# Patient Record
Sex: MALE | Race: WHITE | Employment: STUDENT | URBAN - METROPOLITAN AREA
[De-identification: names, ages, dates, MRNs, and addresses within clinical notes are randomized per-mention and may not be internally consistent; named-entity substitution may affect disease eponyms.]

---
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
## 2024-05-30 ENCOUNTER — HOSPITAL ENCOUNTER (OUTPATIENT)
Facility: HOSPITAL | Age: 19
Setting detail: RECURRING SERIES
End: 2024-05-30
Payer: COMMERCIAL

## 2024-05-30 PROCEDURE — 97162 PT EVAL MOD COMPLEX 30 MIN: CPT | Performed by: GENERAL ACUTE CARE HOSPITAL

## 2024-05-30 NOTE — PROGRESS NOTES
PHYSICAL / OCCUPATIONAL THERAPY - DAILY TREATMENT NOTE (updated )  For Eval visit    Patient Name: Bhargav Knight    Date: 2024    : 2005  Insurance: Payor: DUNG / Plan: [facility] / Product Type: *No Product type* /      Patient  verified yes     Visit #   Current / Total 1 10   Time   In / Out 1152 1219   Total Treatment  Time  27   Pain   In / Out 5 5   Subjective Functional Status/Changes: See below     NEXT PROGRESS NOTE DUE: 2024    TREATMENT AREA =  Other low back pain [M54.59]    SUBJECTIVE:  Patient presents with c/o low back pain, patient states pain started about last  and has progressed. Patient states he has occasional numbness to BLE, especially when sitting. 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 He returns to college for wrestling camp end of .     Functional Limitations:  Radicular pain when sitting, increased back pain after practices, pain with picking up weight in front of him, pain with dead lifting     Max pain: 7/10  Avg pain: 5/10  Min pain: 0/10    Objective Measure: revised DIRK    38%    Past Medical Hx:  Acute onset of low back pain 2023, left LCL injury      Co-morbidities: none reported        OBJECTIVE    Modalities Rationale:     decrease inflammation and decrease pain to improve patient's ability to progress to PLOF and address remaining functional goals.     min [] Estim Unattended, type/location:                                      []  w/ice    []  w/heat    min [] Estim Attended, type/location:                                     []  w/US     []  w/ice    []  w/heat    []  TENS insruct      min []  Mechanical Traction: type/lbs                   []  pro   []  sup   []  int   []  cont    []  before manual    []  after manual    min []  Ultrasound, settings/location:      min []  Iontophoresis w/ dexamethasone, location:

## 2024-05-30 NOTE — PROGRESS NOTES
[facility]  [address], [facility], [address] Phone: [phone] Fax [phone]  Plan of Care / Statement of Necessity for Physical Therapy Services     Patient Name: Bhargav Knight : 2005   Medical   Diagnosis: *Other low back pain [M54.59] Treatment Diagnosis: M54.59  OTHER LOWER BACK PAIN     Onset Date: 2023 Payor: Payor: DUNG / Plan: [facility] / Product Type: *No Product type* /    Referral Source: Wilfrido Cheek MD Start of Care (SOC): 2024   Prior Hospitalization: See medical history Provider #: [id number]   Prior Level of Function: IND, collegiate wrestler    Comorbidities: None reported       Assessment / key information:    SUBJECTIVE:  Patient presents with c/o low back pain, patient states pain started about last  and has progressed. Patient states he has occasional numbness to BLE, especially when sitting. 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 He returns to college for wrestling camp end of .      Functional Limitations:  Radicular pain when sitting, increased back pain after practices, pain with picking up weight in front of him, pain with dead lifting      Max pain: 7/10  Avg pain: 5/10  Min pain: 0/10     Objective Measure: revised DIRK    38%     Past Medical Hx:  Acute onset of low back pain 2023, left LCL injury       Co-morbidities: none reported     Objective Information/Functional Measures/Assessment:  Fall Risk Assessment: Does the patient have a fear of falling? no If yes, what fall risk assessment was performed?      (+) response to gin extension   Right hip extension: 4/5, left hip extension 4-/5   Right glute max 4+/5, left hip extension 4-/5  Left hip ER/IR 4-/5 p! At left knee from previous LCL injury   Dynamic valgus with single leg squat taps on the left      Patient presents for evaluation to

## 2024-06-03 ENCOUNTER — APPOINTMENT (OUTPATIENT)
Facility: HOSPITAL | Age: 19
End: 2024-06-03
Payer: COMMERCIAL

## 2024-06-06 ENCOUNTER — HOSPITAL ENCOUNTER (OUTPATIENT)
Facility: HOSPITAL | Age: 19
Setting detail: RECURRING SERIES
Discharge: HOME | End: 2024-06-09
Payer: COMMERCIAL

## 2024-06-06 PROCEDURE — 97112 NEUROMUSCULAR REEDUCATION: CPT | Performed by: GENERAL ACUTE CARE HOSPITAL

## 2024-06-06 PROCEDURE — 97110 THERAPEUTIC EXERCISES: CPT | Performed by: GENERAL ACUTE CARE HOSPITAL

## 2024-06-06 NOTE — PROGRESS NOTES
PHYSICAL / OCCUPATIONAL THERAPY - DAILY TREATMENT NOTE (updated )    Patient Name: Bhargav Knight    Date: 2024    : 2005  Insurance: Payor: DUNG / Plan: [facility] / Product Type: *No Product type* /      Patient  verified yes     Visit #   Current / Total 2 10   Time   In / Out 1221 106   Total Treatment Time 45   Pain   In / Out 2-3 2-3   Subjective Functional Status/Changes: Patient states she had injection on Monday to L/S, feeling improved pain levels and less pain down his legs.      NEXT PROGRESS NOTE DUE: 2024    TREATMENT AREA =  Other low back pain [M54.59]    OBJECTIVE    Modalities Rationale:     decrease inflammation and decrease pain to improve patient's ability to progress to PLOF and address remaining functional goals.     min [] Estim Unattended, type/location:                                      []  w/ice    []  w/heat    min [] Estim Attended, type/location:                                     []  w/US     []  w/ice    []  w/heat    []  TENS insruct      min []  Mechanical Traction: type/lbs                   []  pro   []  sup   []  int   []  cont    []  before manual    []  after manual    min []  Ultrasound, settings/location:      min []  Iontophoresis w/ dexamethasone, location:                                               []  take home patch       []  in clinic    min  unbilled []  Ice     []  Heat    location/position:     min []  Paraffin,  details:     min []  Vasopneumatic Device, press/temp:     min []  Whirlpool / Fluido:    If using vaso (only need to measure limb vaso being performed on)      pre-treatment girth :       post-treatment girth :       measured at (landmark location) :      min []  Other:    Skin assessment post-treatment (if applicable):    []  intact    []  redness- no adverse reaction                 []redness - adverse reaction:      Vasopnuematic compression justification:  Per bilateral girth measures taken and listed above the edema

## 2024-06-11 ENCOUNTER — HOSPITAL ENCOUNTER (OUTPATIENT)
Facility: HOSPITAL | Age: 19
Setting detail: RECURRING SERIES
Discharge: HOME | End: 2024-06-14
Payer: COMMERCIAL

## 2024-06-11 PROCEDURE — 97112 NEUROMUSCULAR REEDUCATION: CPT

## 2024-06-11 PROCEDURE — 97110 THERAPEUTIC EXERCISES: CPT

## 2024-06-11 NOTE — PROGRESS NOTES
PHYSICAL / OCCUPATIONAL THERAPY - DAILY TREATMENT NOTE (updated )    Patient Name: Bhargav Knight    Date: 2024    : 2005  Insurance: Payor: DUNG / Plan: [facility] / Product Type: *No Product type* /      Patient  verified yes     Visit #   Current / Total 3 10   Time   In / Out 523 604   Total Treatment Time 41   Pain   In / Out 0 0   Subjective Functional Status/Changes: Patient denies pain upon arrival, reporting that he continues to notice benefits from L/S injection received last week.      NEXT PROGRESS NOTE DUE: 2024    TREATMENT AREA =  Other low back pain [M54.59]    OBJECTIVE      Therapeutic Procedures:  Tx Min Billable or 1:1 Min (if diff from Tx Min) Procedure, Rationale, Specifics   15 15 [id number] Therapeutic Exercise (timed):  increase ROM, strength, coordination, balance, and proprioception to improve patient's ability to progress to PLOF and address remaining functional goals. (see flow sheet as applicable)     Details if applicable:    TM  Prone Swimmers   Sliders (retro/abd)     x x [id number] Therapeutic Activity (timed):  use of dynamic activities replicating functional movements to increase ROM, strength, coordination, balance, and proprioception in order to improve patient's ability to progress to PLOF and address remaining functional goals.  (see flow sheet as applicable)     Details if applicable:       26  34892 Neuromuscular Re-Education (timed):  improve balance, coordination, kinesthetic sense, posture, core stability and proprioception to improve patient's ability to develop conscious control of individual muscles and awareness of position of extremities in order to progress to PLOF and address remaining functional goals. (see flow sheet as applicable)     Details if applicable:    BOSU squats  3 way hip on foam with emphasis on maintaining neutral pelvic alignment throughout/hip abductor/QL engagement   Bear planks  Black TB ext with march   SA flexion with TB

## 2024-06-13 ENCOUNTER — HOSPITAL ENCOUNTER (OUTPATIENT)
Facility: HOSPITAL | Age: 19
Setting detail: RECURRING SERIES
Discharge: HOME | End: 2024-06-16
Payer: COMMERCIAL

## 2024-06-13 PROCEDURE — 97112 NEUROMUSCULAR REEDUCATION: CPT | Performed by: GENERAL ACUTE CARE HOSPITAL

## 2024-06-13 PROCEDURE — 97530 THERAPEUTIC ACTIVITIES: CPT | Performed by: GENERAL ACUTE CARE HOSPITAL

## 2024-06-13 PROCEDURE — 97110 THERAPEUTIC EXERCISES: CPT | Performed by: GENERAL ACUTE CARE HOSPITAL

## 2024-06-13 NOTE — PROGRESS NOTES
PHYSICAL / OCCUPATIONAL THERAPY - DAILY TREATMENT NOTE (updated )    Patient Name: Bhargav Knight    Date: 2024    : 2005  Insurance: Payor: DUNG / Plan: [facility] / Product Type: *No Product type* /      Patient  verified yes     Visit #   Current / Total 4 10   Time   In / Out 401 447    Total Treatment Time 46    Pain   In / Out 0 0    Subjective Functional Status/Changes: Patient continues to report no radicular pain, reports compliance with HEP.      NEXT PROGRESS NOTE DUE: 2024    TREATMENT AREA =  Other low back pain [M54.59]    OBJECTIVE      Therapeutic Procedures:  Tx Min Billable or 1:1 Min (if diff from Tx Min) Procedure, Rationale, Specifics   13  8 73812 Therapeutic Exercise (timed):  increase ROM, strength, coordination, balance, and proprioception to improve patient's ability to progress to PLOF and address remaining functional goals. (see flow sheet as applicable)     Details if applicable:    TM  Prone Swimmers   Sliders (retro/abd) with foam pad      8 8 97710 Therapeutic Activity (timed):  use of dynamic activities replicating functional movements to increase ROM, strength, coordination, balance, and proprioception in order to improve patient's ability to progress to PLOF and address remaining functional goals.  (see flow sheet as applicable)     Details if applicable:    Qped bird dog   Qped hydrant   Single leg squats    20  20 94343 Neuromuscular Re-Education (timed):  improve balance, coordination, kinesthetic sense, posture, core stability and proprioception to improve patient's ability to develop conscious control of individual muscles and awareness of position of extremities in order to progress to PLOF and address remaining functional goals. (see flow sheet as applicable)     Details if applicable:    BOSU squats  3 way hip on foam with emphasis on maintaining neutral pelvic alignment throughout/hip abductor/QL engagement   Bear planks  Black TB ext with

## 2024-06-18 ENCOUNTER — APPOINTMENT (OUTPATIENT)
Facility: HOSPITAL | Age: 19
End: 2024-06-18
Payer: COMMERCIAL

## (undated) DEVICE — STOCKING COMPR L L29-31IN REG 19MMHG ANK 9-10IN CALF

## (undated) DEVICE — KIT PREP COMPLT 1 SOURC MAGELLAN

## (undated) DEVICE — NEEDLE HYPO 25GA L1.5IN BLU POLYPR HUB S STL REG BVL STR

## (undated) DEVICE — FAST-FIX 360 STRAIGHT KNOT                                    PUSHER/CUTTER AND SLOTTED CANNULA SETS: Brand: FAST-FIX

## (undated) DEVICE — PREP SKN CHLRAPRP APL 26ML STR --

## (undated) DEVICE — WEREWOLF FLOW 50 COBLATION WAND: Brand: COBLATION

## (undated) DEVICE — STERILE POLYISOPRENE POWDER-FREE SURGICAL GLOVES: Brand: PROTEXIS

## (undated) DEVICE — TOWEL,OR,DSP,ST,BLUE,STD,4/PK,20PK/CS: Brand: MEDLINE

## (undated) DEVICE — TUBING PMP L8FT LNG W/ CONN FOR AR-6400 REDEUCE

## (undated) DEVICE — PACK PROCEDURE SURG KNEE ARTHSCP CUST

## (undated) DEVICE — TUBE IRRIG L8IN LNG PT W/ CONN FOR PMP SYS REDEUCE

## (undated) DEVICE — GARMENT,MEDLINE,DVT,INT,CALF,MED, GEN2: Brand: MEDLINE

## (undated) DEVICE — TRANSITION ROM KNEE BRACE: Brand: DEROYAL

## (undated) DEVICE — SPONGE LAP 18X18IN STRL -- 5/PK

## (undated) DEVICE — SUT ETHLN 4-0 18IN PS2 BLK --

## (undated) DEVICE — SOLUTION IRRIG 3000ML LAC R FLX CONT